# Patient Record
Sex: MALE | Race: WHITE | NOT HISPANIC OR LATINO | Employment: OTHER | URBAN - METROPOLITAN AREA
[De-identification: names, ages, dates, MRNs, and addresses within clinical notes are randomized per-mention and may not be internally consistent; named-entity substitution may affect disease eponyms.]

---

## 2020-01-06 ENCOUNTER — HOSPITAL ENCOUNTER (OUTPATIENT)
Dept: NUCLEAR MEDICINE | Facility: HOSPITAL | Age: 45
Discharge: HOME | End: 2020-01-06
Attending: INTERNAL MEDICINE

## 2020-02-06 ENCOUNTER — OFFICE VISIT (OUTPATIENT)
Dept: CARDIOLOGY | Facility: CLINIC | Age: 45
End: 2020-02-06

## 2020-02-06 VITALS
BODY MASS INDEX: 42.66 KG/M2 | WEIGHT: 315 LBS | SYSTOLIC BLOOD PRESSURE: 118 MMHG | DIASTOLIC BLOOD PRESSURE: 60 MMHG | OXYGEN SATURATION: 95 % | HEIGHT: 72 IN | HEART RATE: 67 BPM

## 2020-02-06 DIAGNOSIS — I48.0 PAF (PAROXYSMAL ATRIAL FIBRILLATION) (HCC): Primary | ICD-10-CM

## 2020-02-06 PROCEDURE — 93000 ELECTROCARDIOGRAM COMPLETE: CPT | Performed by: INTERNAL MEDICINE

## 2020-02-06 PROCEDURE — 99204 OFFICE O/P NEW MOD 45 MIN: CPT | Performed by: INTERNAL MEDICINE

## 2020-02-06 RX ORDER — AMIODARONE HYDROCHLORIDE 200 MG/1
200 TABLET ORAL DAILY
COMMUNITY
End: 2020-03-19

## 2020-02-06 RX ORDER — ALLOPURINOL 300 MG/1
300 TABLET ORAL DAILY
COMMUNITY

## 2020-02-06 RX ORDER — LISINOPRIL 2.5 MG/1
2.5 TABLET ORAL DAILY
COMMUNITY

## 2020-02-10 NOTE — H&P (VIEW-ONLY)
Subjective:     Encounter Date:02/06/2020      Patient ID: Benjie Coleman is a 44 y.o. male.    Chief Complaint:  Atrial Fibrillation   Presents for initial visit. Symptoms are negative for chest pain, palpitations, shortness of breath and tachycardia. Past medical history includes atrial fibrillation.       44-year-old gentleman who presents today for evaluation.  Patient got sick back in December 9, 2019.  At that time he had the flu and was very ill and was admitted to the hospital in [facility].  Patient's work-up included echocardiogram that showed his ejection fraction was 38%.  He underwent a perfusion study that showed no ischemia his ejection fraction by nuclear perfusion study was calculated at 34%.  Patient has a history of sleep apnea he uses his CPAP intermittently.  He says any falls asleep on the couch he does not utilize it but for the most part if he goes to bed he utilizes it.  He wanted another opinion and subsequent presents today for evaluation.      Review of Systems   Cardiovascular: Negative for chest pain and palpitations.   Respiratory: Negative for shortness of breath.    All other systems reviewed and are negative.        ECG 12 Lead  Date/Time: 2/6/2020 1:48 PM  Performed by: Naseem Martinez MD  Authorized by: Naseem Martinez MD   Comparison: compared with previous ECG from 12/23/2019  Similar to previous ECG  Rhythm: atrial fibrillation    Clinical impression: abnormal EKG               Objective:     Physical Exam   Constitutional: He is oriented to person, place, and time. He appears well-developed.   HENT:   Head: Normocephalic.   Eyes: Conjunctivae are normal.   Neck: Normal range of motion.   Cardiovascular: Normal rate and normal heart sounds. An irregularly irregular rhythm present.   Pulmonary/Chest: Breath sounds normal.   Abdominal: Soft. Bowel sounds are normal.   Musculoskeletal: Normal range of motion. He exhibits no edema.   Neurological: He is alert and oriented to  person, place, and time.   Skin: Skin is warm and dry.   Psychiatric: He has a normal mood and affect. His behavior is normal.   Vitals reviewed.      Lab Review:       Assessment:          Diagnosis Plan   1. PAF (paroxysmal atrial fibrillation) (CMS/[facility])  Cardioversion External in Cardiology Department          Plan:       1.  Atrial fibrillation.  Patient remains in atrial fibrillation with a rapid rate which is more likely causing his decreased LV function.  Since his rate has been so hard to control and the fact his decreased EF I do think he would benefit from a cardioversion.  He is started on Eliquis and has not missed a dose since he started it in mid December.  He is also been working on his weight he was 414 when he came to the hospital he is down to 394.  I also stressed the importance of utilizing his CPAP it can help change his atrial fibrillation.  He was placed on amiodarone by a cardiologist in [place].  In someone who so young I am really would like to get him off this.  For the time being we will leave him on it to help cardiovert him.  We will set him up for cardioversion risk and benefits were explained.  Also of note his QTc interval on his ECG today calculated at 517 however this is due to the atrial fibrillation with rapid rate and I continue the amiodarone at the current dose.

## 2020-02-10 NOTE — PROGRESS NOTES
Subjective:     Encounter Date:02/06/2020      Patient ID: Benjie Coleman is a 44 y.o. male.    Chief Complaint:  Atrial Fibrillation   Presents for initial visit. Symptoms are negative for chest pain, palpitations, shortness of breath and tachycardia. Past medical history includes atrial fibrillation.       44-year-old gentleman who presents today for evaluation.  Patient got sick back in December 9, 2019.  At that time he had the flu and was very ill and was admitted to the hospital in [facility].  Patient's work-up included echocardiogram that showed his ejection fraction was 38%.  He underwent a perfusion study that showed no ischemia his ejection fraction by nuclear perfusion study was calculated at 34%.  Patient has a history of sleep apnea he uses his CPAP intermittently.  He says any falls asleep on the couch he does not utilize it but for the most part if he goes to bed he utilizes it.  He wanted another opinion and subsequent presents today for evaluation.      Review of Systems   Cardiovascular: Negative for chest pain and palpitations.   Respiratory: Negative for shortness of breath.    All other systems reviewed and are negative.        ECG 12 Lead  Date/Time: 2/6/2020 1:48 PM  Performed by: Naseem Martinez MD  Authorized by: Naseem Martinez MD   Comparison: compared with previous ECG from 12/23/2019  Similar to previous ECG  Rhythm: atrial fibrillation    Clinical impression: abnormal EKG               Objective:     Physical Exam   Constitutional: He is oriented to person, place, and time. He appears well-developed.   HENT:   Head: Normocephalic.   Eyes: Conjunctivae are normal.   Neck: Normal range of motion.   Cardiovascular: Normal rate and normal heart sounds. An irregularly irregular rhythm present.   Pulmonary/Chest: Breath sounds normal.   Abdominal: Soft. Bowel sounds are normal.   Musculoskeletal: Normal range of motion. He exhibits no edema.   Neurological: He is alert and oriented to  person, place, and time.   Skin: Skin is warm and dry.   Psychiatric: He has a normal mood and affect. His behavior is normal.   Vitals reviewed.      Lab Review:       Assessment:          Diagnosis Plan   1. PAF (paroxysmal atrial fibrillation) (CMS/[facility])  Cardioversion External in Cardiology Department          Plan:       1.  Atrial fibrillation.  Patient remains in atrial fibrillation with a rapid rate which is more likely causing his decreased LV function.  Since his rate has been so hard to control and the fact his decreased EF I do think he would benefit from a cardioversion.  He is started on Eliquis and has not missed a dose since he started it in mid December.  He is also been working on his weight he was 414 when he came to the hospital he is down to 394.  I also stressed the importance of utilizing his CPAP it can help change his atrial fibrillation.  He was placed on amiodarone by a cardiologist in [place].  In someone who so young I am really would like to get him off this.  For the time being we will leave him on it to help cardiovert him.  We will set him up for cardioversion risk and benefits were explained.  Also of note his QTc interval on his ECG today calculated at 517 however this is due to the atrial fibrillation with rapid rate and I continue the amiodarone at the current dose.

## 2020-02-13 ENCOUNTER — TRANSCRIBE ORDERS (OUTPATIENT)
Dept: CARDIOLOGY | Facility: CLINIC | Age: 45
End: 2020-02-13

## 2020-02-13 DIAGNOSIS — Z13.6 SCREENING FOR ISCHEMIC HEART DISEASE: ICD-10-CM

## 2020-02-13 DIAGNOSIS — Z01.810 PRE-OPERATIVE CARDIOVASCULAR EXAMINATION: Primary | ICD-10-CM

## 2020-02-14 ENCOUNTER — TRANSCRIBE ORDERS (OUTPATIENT)
Dept: CARDIOLOGY | Facility: CLINIC | Age: 45
End: 2020-02-14

## 2020-02-17 ENCOUNTER — ANESTHESIA EVENT (OUTPATIENT)
Dept: POSTOP/PACU | Facility: HOSPITAL | Age: 45
End: 2020-02-17

## 2020-02-17 ENCOUNTER — ANESTHESIA (OUTPATIENT)
Dept: POSTOP/PACU | Facility: HOSPITAL | Age: 45
End: 2020-02-17

## 2020-02-17 ENCOUNTER — HOSPITAL ENCOUNTER (OUTPATIENT)
Dept: POSTOP/PACU | Facility: HOSPITAL | Age: 45
Discharge: HOME | End: 2020-02-17
Admitting: INTERNAL MEDICINE

## 2020-02-17 VITALS — DIASTOLIC BLOOD PRESSURE: 93 MMHG | OXYGEN SATURATION: 100 % | SYSTOLIC BLOOD PRESSURE: 116 MMHG

## 2020-02-17 VITALS
HEART RATE: 63 BPM | OXYGEN SATURATION: 96 % | SYSTOLIC BLOOD PRESSURE: 139 MMHG | DIASTOLIC BLOOD PRESSURE: 104 MMHG | TEMPERATURE: 97.8 F | RESPIRATION RATE: 18 BRPM

## 2020-02-17 DIAGNOSIS — I48.0 PAF (PAROXYSMAL ATRIAL FIBRILLATION) (HCC): ICD-10-CM

## 2020-02-17 LAB
ANION GAP SERPL CALCULATED.3IONS-SCNC: 9.3 MMOL/L (ref 5–15)
BUN BLD-MCNC: 18 MG/DL (ref 6–20)
BUN/CREAT SERPL: 17 (ref 7–25)
CALCIUM SPEC-SCNC: 8.6 MG/DL (ref 8.6–10.5)
CHLORIDE SERPL-SCNC: 104 MMOL/L (ref 98–107)
CO2 SERPL-SCNC: 28.7 MMOL/L (ref 22–29)
CREAT BLD-MCNC: 1.06 MG/DL (ref 0.76–1.27)
GFR SERPL CREATININE-BSD FRML MDRD: 76 ML/MIN/1.73
GLUCOSE BLD-MCNC: 100 MG/DL (ref 65–99)
POTASSIUM BLD-SCNC: 4.3 MMOL/L (ref 3.5–5.2)
SODIUM BLD-SCNC: 142 MMOL/L (ref 136–145)

## 2020-02-17 PROCEDURE — 25010000003 LIDOCAINE 1 % SOLUTION: Performed by: NURSE ANESTHETIST, CERTIFIED REGISTERED

## 2020-02-17 PROCEDURE — 25010000002 PROPOFOL 10 MG/ML EMULSION: Performed by: NURSE ANESTHETIST, CERTIFIED REGISTERED

## 2020-02-17 PROCEDURE — 93010 ELECTROCARDIOGRAM REPORT: CPT | Performed by: INTERNAL MEDICINE

## 2020-02-17 PROCEDURE — 80048 BASIC METABOLIC PNL TOTAL CA: CPT | Performed by: INTERNAL MEDICINE

## 2020-02-17 PROCEDURE — 92960 CARDIOVERSION ELECTRIC EXT: CPT

## 2020-02-17 PROCEDURE — 92960 CARDIOVERSION ELECTRIC EXT: CPT | Performed by: INTERNAL MEDICINE

## 2020-02-17 PROCEDURE — 93005 ELECTROCARDIOGRAM TRACING: CPT | Performed by: INTERNAL MEDICINE

## 2020-02-17 RX ORDER — SODIUM CHLORIDE 0.9 % (FLUSH) 0.9 %
3-10 SYRINGE (ML) INJECTION AS NEEDED
Status: CANCELLED | OUTPATIENT
Start: 2020-02-17

## 2020-02-17 RX ORDER — ONDANSETRON 2 MG/ML
4 INJECTION INTRAMUSCULAR; INTRAVENOUS ONCE AS NEEDED
Status: DISCONTINUED | OUTPATIENT
Start: 2020-02-17 | End: 2020-02-18 | Stop reason: HOSPADM

## 2020-02-17 RX ORDER — LIDOCAINE HYDROCHLORIDE 10 MG/ML
0.5 INJECTION, SOLUTION EPIDURAL; INFILTRATION; INTRACAUDAL; PERINEURAL ONCE AS NEEDED
Status: CANCELLED | OUTPATIENT
Start: 2020-02-17

## 2020-02-17 RX ORDER — PROPOFOL 10 MG/ML
VIAL (ML) INTRAVENOUS AS NEEDED
Status: DISCONTINUED | OUTPATIENT
Start: 2020-02-17 | End: 2020-02-17 | Stop reason: SURG

## 2020-02-17 RX ORDER — FAMOTIDINE 10 MG/ML
20 INJECTION, SOLUTION INTRAVENOUS ONCE
Status: CANCELLED | OUTPATIENT
Start: 2020-02-17 | End: 2020-02-17

## 2020-02-17 RX ORDER — SODIUM CHLORIDE 0.9 % (FLUSH) 0.9 %
3 SYRINGE (ML) INJECTION EVERY 12 HOURS SCHEDULED
Status: CANCELLED | OUTPATIENT
Start: 2020-02-17

## 2020-02-17 RX ORDER — SODIUM CHLORIDE, SODIUM LACTATE, POTASSIUM CHLORIDE, CALCIUM CHLORIDE 600; 310; 30; 20 MG/100ML; MG/100ML; MG/100ML; MG/100ML
INJECTION, SOLUTION INTRAVENOUS CONTINUOUS PRN
Status: DISCONTINUED | OUTPATIENT
Start: 2020-02-17 | End: 2020-02-17 | Stop reason: SURG

## 2020-02-17 RX ORDER — LIDOCAINE HYDROCHLORIDE 10 MG/ML
INJECTION, SOLUTION INFILTRATION; PERINEURAL AS NEEDED
Status: DISCONTINUED | OUTPATIENT
Start: 2020-02-17 | End: 2020-02-17 | Stop reason: SURG

## 2020-02-17 RX ORDER — SODIUM CHLORIDE, SODIUM LACTATE, POTASSIUM CHLORIDE, CALCIUM CHLORIDE 600; 310; 30; 20 MG/100ML; MG/100ML; MG/100ML; MG/100ML
9 INJECTION, SOLUTION INTRAVENOUS CONTINUOUS
Status: CANCELLED | OUTPATIENT
Start: 2020-02-17

## 2020-02-17 RX ADMIN — SODIUM CHLORIDE, POTASSIUM CHLORIDE, SODIUM LACTATE AND CALCIUM CHLORIDE: 600; 310; 30; 20 INJECTION, SOLUTION INTRAVENOUS at 07:50

## 2020-02-17 RX ADMIN — PROPOFOL 60 MG: 10 INJECTION, EMULSION INTRAVENOUS at 07:53

## 2020-02-17 RX ADMIN — PROPOFOL 60 MG: 10 INJECTION, EMULSION INTRAVENOUS at 07:55

## 2020-02-17 RX ADMIN — LIDOCAINE HYDROCHLORIDE 40 ML: 10 INJECTION, SOLUTION INFILTRATION; PERINEURAL at 07:52

## 2020-02-17 NOTE — ANESTHESIA PREPROCEDURE EVALUATION
Anesthesia Evaluation     Patient summary reviewed and Nursing notes reviewed   NPO Solid Status: > 8 hours  NPO Liquid Status: > 2 hours           Airway   Mallampati: II  TM distance: >3 FB  Neck ROM: full  Dental - normal exam     Pulmonary - normal exam    breath sounds clear to auscultation  (+) sleep apnea,   Cardiovascular - normal exam    Rhythm: regular  Rate: normal    (+) hypertension well controlled less than 2 medications, dysrhythmias Atrial Fib,   (-) angina, orthopnea, PND, JUAREZ      Neuro/Psych- negative ROS  GI/Hepatic/Renal/Endo    (+) morbid obesity,      Musculoskeletal (-) negative ROS    Abdominal    Substance History - negative use     OB/GYN negative ob/gyn ROS         Other - negative ROS                       Anesthesia Plan    ASA 3     MAC       Anesthetic plan, all risks, benefits, and alternatives have been provided, discussed and informed consent has been obtained with: patient.

## 2020-02-17 NOTE — ANESTHESIA POSTPROCEDURE EVALUATION
Patient: Benjie Coleman    Procedure Summary     Date:  02/17/20 Room / Location:  [facility] PACU    Anesthesia Start:  0750 Anesthesia Stop:  0805    Procedure:  CARDIOVERSION EXTERNAL IN CARDIOLOGY DEPARTMENT Diagnosis:       PAF (paroxysmal atrial fibrillation) (CMS/HCC)      (afib)    Scheduled Providers:  Naseem Martinez MD Provider:  Senthil Cochran MD    Anesthesia Type:  MAC ASA Status:  3          Anesthesia Type: MAC    Vitals  Vitals Value Taken Time   /70 2/17/2020  8:10 AM   Temp     Pulse 65 2/17/2020  8:21 AM   Resp 18 2/17/2020  8:21 AM   SpO2 99 % 2/17/2020  8:10 AM           Post Anesthesia Care and Evaluation    Patient location during evaluation: bedside  Patient participation: complete - patient participated  Level of consciousness: awake and alert  Pain management: adequate  Airway patency: patent  Anesthetic complications: No anesthetic complications    Cardiovascular status: acceptable  Respiratory status: acceptable  Hydration status: acceptable    Comments: BP (!) 140/106   Pulse 67   Temp 36.6 °C (97.8 °F) (Oral)   Resp 18   SpO2 95%

## 2020-03-18 ENCOUNTER — TELEPHONE (OUTPATIENT)
Dept: CARDIOLOGY | Facility: CLINIC | Age: 45
End: 2020-03-18

## 2020-03-18 NOTE — TELEPHONE ENCOUNTER
KUSUM for pt to call about appt tomorrow.  I told him I need to due a health screening before he comes and asked him to call back./prt

## 2020-03-19 ENCOUNTER — OFFICE VISIT (OUTPATIENT)
Dept: CARDIOLOGY | Facility: CLINIC | Age: 45
End: 2020-03-19

## 2020-03-19 VITALS
DIASTOLIC BLOOD PRESSURE: 84 MMHG | WEIGHT: 315 LBS | HEART RATE: 61 BPM | BODY MASS INDEX: 42.66 KG/M2 | SYSTOLIC BLOOD PRESSURE: 130 MMHG | HEIGHT: 72 IN

## 2020-03-19 DIAGNOSIS — I48.0 PAF (PAROXYSMAL ATRIAL FIBRILLATION) (HCC): Primary | ICD-10-CM

## 2020-03-19 DIAGNOSIS — I42.8 NICM (NONISCHEMIC CARDIOMYOPATHY) (HCC): ICD-10-CM

## 2020-03-19 PROCEDURE — 99214 OFFICE O/P EST MOD 30 MIN: CPT | Performed by: INTERNAL MEDICINE

## 2020-03-19 PROCEDURE — 93000 ELECTROCARDIOGRAM COMPLETE: CPT | Performed by: INTERNAL MEDICINE

## 2020-03-19 NOTE — PROGRESS NOTES
Subjective:     Encounter Date:03/19/2020      Patient ID: Benjie Coleman is a 44 y.o. male.    Chief Complaint:  Atrial Fibrillation   Presents for follow-up visit. Symptoms are negative for chest pain, hypertension, palpitations and shortness of breath. The symptoms have been stable. Past medical history includes atrial fibrillation.     44-year-old gentleman with a history of atrial fibrillation.  He presented to a hospital in [facility] for what he thought was the flu.44-year-old gentlemanUltimately he was diagnosed with atrial fibrillation and subsequently came to my office several months ago.  He remained in atrial fibrillation so ultimately I cardioverted him.  He presents today for reassessment doing well.  He said he never really felt his atrial fibrillation exactly sure he feels much different his ECG confirmed he still in sinus rhythm today.  He was placed on amiodarone by physician in [facility] and had spoke to him before about how I really want to stop this.  Again clinically he is doing well.      Review of Systems   Cardiovascular: Negative for chest pain and palpitations.   Respiratory: Negative for shortness of breath.          ECG 12 Lead  Date/Time: 3/19/2020 2:19 PM  Performed by: Naseem Martinez MD  Authorized by: Naseem Martinez MD   Comparison: compared with previous ECG from 2/17/2020  Similar to previous ECG  Rhythm: sinus rhythm    Clinical impression: normal ECG               Objective:     Physical Exam   Constitutional: He is oriented to person, place, and time. He appears well-developed.   HENT:   Head: Normocephalic.   Eyes: Conjunctivae are normal.   Neck: Normal range of motion.   Cardiovascular: Normal rate, regular rhythm and normal heart sounds.   Pulmonary/Chest: Breath sounds normal.   Abdominal: Soft. Bowel sounds are normal.   Musculoskeletal: Normal range of motion. He exhibits no edema.   Neurological: He is alert and oriented to person, place, and time.   Skin: Skin  is warm and dry.   Psychiatric: He has a normal mood and affect. His behavior is normal.   Vitals reviewed.      Lab Review:       Assessment:          Diagnosis Plan   1. PAF (paroxysmal atrial fibrillation) (CMS/HCC)  Adult Transthoracic Echo Complete W/ Cont if Necessary Per Protocol   2. NICM (nonischemic cardiomyopathy) (CMS/HCC)  Adult Transthoracic Echo Complete W/ Cont if Necessary Per Protocol          Plan:         1   Paroxysmal atrial fibrillation.  He remains in sinus rhythm on today's ECG.  I do want to stop his amiodarone so he is going to stop this.  I would leave him on his Eliquis for the time being.  I went to see him back in about 3 months I am also going to set him up for an echocardiogram at that time to see if his ejection fraction has recovered.  He clinically is doing well but says he never really felt bad even when he was in atrial fibrillation.  Will follow for now and see what evolves see back in 3 months.

## 2020-03-22 ENCOUNTER — TELEPHONE (OUTPATIENT)
Dept: CARDIOLOGY | Facility: CLINIC | Age: 45
End: 2020-03-22

## 2020-03-22 NOTE — TELEPHONE ENCOUNTER
ELIQUIS APPROVED    02/21/20 TO 03/22/21    CASE ID: [id number]    RX# [id number]    PER [facility]

## 2020-10-29 ENCOUNTER — OFFICE VISIT (OUTPATIENT)
Dept: SLEEP MEDICINE | Facility: HOSPITAL | Age: 45
End: 2020-10-29

## 2020-10-29 VITALS
OXYGEN SATURATION: 99 % | WEIGHT: 315 LBS | SYSTOLIC BLOOD PRESSURE: 151 MMHG | HEIGHT: 72 IN | HEART RATE: 84 BPM | BODY MASS INDEX: 42.66 KG/M2 | DIASTOLIC BLOOD PRESSURE: 90 MMHG

## 2020-10-29 DIAGNOSIS — E66.01 MORBID (SEVERE) OBESITY DUE TO EXCESS CALORIES (HCC): ICD-10-CM

## 2020-10-29 DIAGNOSIS — R06.83 SNORING: ICD-10-CM

## 2020-10-29 DIAGNOSIS — R06.81 WITNESSED EPISODE OF APNEA: Primary | ICD-10-CM

## 2020-10-29 PROCEDURE — G0463 HOSPITAL OUTPT CLINIC VISIT: HCPCS

## 2020-10-29 NOTE — PROGRESS NOTES
"[facility]  Telephone: [phone] / Fax: [phone] [place]  Telephone: [phone] / Fax: [phone] [facility] Abreu    Referring Physician: Bao Dobson MD  PCP: Bao Dobson MD    Reason for consult:  sleep apnea    Benjie Coleman is a 45 y.o.male  was seen in the Sleep Disorders Center today for evaluation of sleep apnea.     He was diagnosed with KESHA 10 years ago. He used the machine for 10 years-until 3 weeks ago-when it suddenly broke.  I do not have a copy of his old sleep study. He is here today to be re-evaluated for a new machine. He sleeps from 9:30pm-6:30am. He reports history of loud snoring, gasping for breath and choking-reported by girl friend and daughter. If he uses the CPAP machine he dreams. If he does not use the machine, he dreams rarely. He has history of a-fib requiring cardioversion in February 2020. His ESS is 11.      SH- works with [employer], drinks 4 alc drinks per week, 1/2 coffee per day.    ROS-negative.    Benjie Coleman  has a past medical history of Atrial fibrillation (CMS/HCC) (12/09/2019) and Sleep apnea.    Current Medications:    Current Outpatient Medications:   •  allopurinol (ZYLOPRIM) 300 MG tablet, Take 300 mg by mouth Daily., Disp: , Rfl:   •  apixaban (ELIQUIS) 5 MG tablet tablet, Take 1 tablet by mouth Every 12 (Twelve) Hours., Disp: 60 tablet, Rfl: 11  •  lisinopril (PRINIVIL,ZESTRIL) 2.5 MG tablet, Take 2.5 mg by mouth Daily., Disp: , Rfl:     I have reviewed Past Medical History, Past Surgical History, Medication List, Social History and Family History as entered in Sleep Questionnaire and EPIC.    ESS  11   Vital Signs /90   Pulse 84   Ht 182.9 cm (72\")   Wt (!) 178 kg (391 lb 8 oz)   SpO2 99%   BMI 53.10 kg/m²  Body mass index is 53.1 kg/m².    General Alert and oriented. No acute distress noted   Pharynx/Throat Class IV Mallampati airway, large tongue, no evidence of redundant lateral " pharyngeal tissue. No oral lesions. No thrush. Moist mucous membranes.   Head Normocephalic. Symmetrical. Atraumatic.    Nose No septal deviation. No drainage   Chest Wall Normal shape. Symmetric expansion with respiration. No tenderness.   Neck Trachea midline, no thyromegaly or adenopathy    Lungs Clear to auscultation bilaterally. No wheezes. No rhonchi. No rales. Respirations regular, even and unlabored.   Heart Regular rhythm and normal rate. Normal S1 and S2. No murmur   Abdomen Soft, non-tender and non-distended. Normal bowel sounds. No masses.   Extremities Moves all extremities well. No edema   Psychiatric Normal mood and affect.        Impression:  1. Witnessed episode of apnea    2. Snoring    3. Morbid (severe) obesity due to excess calories (CMS/HCC)          Plan:  I discussed the pathophysiology of obstructive sleep apnea with the patient.  We discussed the adverse outcomes associated with untreated sleep-disordered breathing.  We discussed treatment modalities of obstructive sleep apnea including CPAP device. Sleep study will be scheduled to establish a definitive diagnosis of sleep disorder breathing.  Weight loss will be strongly beneficial in order to reduce the severity of sleep-disordered breathing.  Patient has narrow oropharyngeal structure.  Caution during activities that require prolonged concentration is strongly advised.  After sleep study results are available, patient will be notified, and appointment will be scheduled to discuss sleep study results and treatment recommendations.    We will try to get the HST done ASAP as he needs a new machine ASAP. He likely has very severe KESHA with sleep related hypoxemia.      I appreciate the opportunity to participate in this patient's care.      MEGGAN Bernal  [facility]  Phone: [phone]      Part of this note may be an electronic transcription/translation of spoken language to printed text using the Dragon Dictation  System. Some errors may exist even though the document was edited.

## 2020-11-12 ENCOUNTER — HOSPITAL ENCOUNTER (OUTPATIENT)
Dept: SLEEP MEDICINE | Facility: HOSPITAL | Age: 45
End: 2020-11-12

## 2020-12-01 ENCOUNTER — HOSPITAL ENCOUNTER (OUTPATIENT)
Dept: SLEEP MEDICINE | Facility: HOSPITAL | Age: 45
Discharge: HOME | End: 2020-12-01

## 2020-12-01 DIAGNOSIS — R06.81 WITNESSED EPISODE OF APNEA: ICD-10-CM

## 2020-12-01 DIAGNOSIS — R06.83 SNORING: ICD-10-CM

## 2020-12-01 DIAGNOSIS — E66.01 MORBID (SEVERE) OBESITY DUE TO EXCESS CALORIES (HCC): ICD-10-CM

## 2020-12-28 ENCOUNTER — HOSPITAL ENCOUNTER (OUTPATIENT)
Dept: SLEEP MEDICINE | Facility: HOSPITAL | Age: 45
Discharge: HOME | End: 2020-12-28
Admitting: INTERNAL MEDICINE

## 2020-12-28 PROCEDURE — 95806 SLEEP STUDY UNATT&RESP EFFT: CPT

## 2021-01-05 ENCOUNTER — TELEPHONE (OUTPATIENT)
Dept: SLEEP MEDICINE | Facility: HOSPITAL | Age: 46
End: 2021-01-05

## 2021-01-05 NOTE — TELEPHONE ENCOUNTER
Spoke to patient about results, Faxerd orders to [facility] and asked them to [facility] if possible as patient is severe. F/u on 3/5/2021-AK

## 2021-03-05 ENCOUNTER — OFFICE VISIT (OUTPATIENT)
Dept: SLEEP MEDICINE | Facility: HOSPITAL | Age: 46
End: 2021-03-05

## 2021-03-05 VITALS
OXYGEN SATURATION: 97 % | WEIGHT: 315 LBS | HEIGHT: 72 IN | HEART RATE: 60 BPM | BODY MASS INDEX: 42.66 KG/M2 | SYSTOLIC BLOOD PRESSURE: 120 MMHG | DIASTOLIC BLOOD PRESSURE: 72 MMHG

## 2021-03-05 DIAGNOSIS — E66.01 OBESITY, MORBID, BMI 40.0-49.9 (HCC): ICD-10-CM

## 2021-03-05 DIAGNOSIS — G47.33 OBSTRUCTIVE SLEEP APNEA: Primary | ICD-10-CM

## 2021-03-05 PROCEDURE — G0463 HOSPITAL OUTPT CLINIC VISIT: HCPCS

## 2021-03-07 NOTE — PROGRESS NOTES
"[facility] SLEEP MEDICINE  [address] [facility]  3RD FLOOR  [place]iatric [address]  [phone]    PCP: Bao Dobson MD    Reason for visit:  Sleep disorders: KESHA    Benjie is a 45 y.o.male who was seen in the Sleep Disorders Center today.  Patient was last seen here 10/19/2020.  He had previously been using a CPAP machine which broke.  He came to us requesting a new study.  He was set up with the same after a home sleep test.  HST showed severe sleep apnea.  He is doing well with his new CPAP.  He sleeps from 9:30 PM to 6 AM.  He wakes up rested and refreshed.  Uses a full facemask.  Some leaks but no dry mouth.  No EDS.  His DME is [facility].  [place] Sleepiness Scale is 0. Caffeine 0 per day. Alcohol for per week.    Benjie  reports that he has never smoked. He has never used smokeless tobacco.    Pertinent Positive Review of Systems of denies  Rest of Review of Systems was negative as recorded in Sleep Questionnaire.    Patient  has a past medical history of Atrial fibrillation (CMS/[facility]) (12/09/2019) and Sleep apnea.     Current Medications:    Current Outpatient Medications:   •  allopurinol (ZYLOPRIM) 300 MG tablet, Take 300 mg by mouth Daily., Disp: , Rfl:   •  lisinopril (PRINIVIL,ZESTRIL) 2.5 MG tablet, Take 2.5 mg by mouth Daily., Disp: , Rfl:    also entered in Sleep Questionnaire         Vital Signs: /72   Pulse 60   Ht 182.9 cm (72\")   Wt (!) 181 kg (399 lb)   SpO2 97%   BMI 54.11 kg/m²     Body mass index is 54.11 kg/m².       Tongue: Normal     Dentition: good       Pharynx: Posterior pharyngeal pillars are wide   Mallampatti: IV (only hard palate visible)        General: Alert. Cooperative. Well developed. No acute distress.             Head:  Normocephalic. Symmetrical. Atraumatic.              Nose: No septal deviation. No drainage.          Throat: No oral lesions. No thrush. Moist mucous membranes.    Chest Wall:  Normal shape. Symmetric expansion with respiration. No " tenderness.             Neck:  Trachea midline.           Lungs:  Clear to auscultation bilaterally. No wheezes. No rhonchi. No rales. Respirations regular, even and unlabored.            Heart:  Regular rhythm and normal rate. Normal S1 and S2. No murmur.     Abdomen:  Soft, non-tender and non-distended. Normal bowel sounds. No masses.  Extremities:  Moves all extremities well. No edema.    Psychiatric: Normal mood and affect.    Diagnostic data available to date is as below and was reviewed on current visit:  · 12/29/2020  The patient tolerated the home sleep testing with monitoring time of 269 minutes. The data obtained make this a technically adequate study. The apnea hypopneas index(AHI) was 83.2 per sleep hour.  The AHI during supine position was 92.4 per sleep hour. Mean heart rate of 62.8 BPM.  Snoring was noted -% of sleep time. Lowest oxygen saturation during the study was 33%. Saturation below 89% was noted for 218.6 mins.     Most current available usage data reviewed on 03/05/2021:  · Good compliance and set up with average usage 7 hours 45 minutes.  Leak for 1 hour.  AHI 2.5.  Average pressure 13 to 16 cm.    [employer]: [employer]    Impression:  1. Obstructive sleep apnea    2. Obesity, morbid, BMI 40.0-49.9 (CMS/HCC)        Plan:  Benjie likes his current machine.  He likes to keep his mask slightly loose.  He therefore has some air leaks.  However it does not bother him.  He wakes up rested.  He was reminded to change his supplies regularly.  Otherwise compliant and doing well.    I reiterated the importance of effective treatment of obstructive sleep apnea with PAP machine.  Cardiovascular health risks of untreated sleep apnea were again reviewed.  Patient was asked to remain cautious if there is persistent hypersomnolence. The benefit of weight loss in reducing severity of obstructive sleep apnea was discussed.  Patient would benefit from adhering to a strict diet to achieve ideal BMI.     Change of  PAP supplies regularly is important for effective use.  Change of cushion on the mask or plugs on nasal pillows along with disposable filters once every month and change of mask frame, tubing, headgear and Velcro straps every 6 months at the minimum was reiterated.    This patient is compliant with PAP machine and benefits from its use.  Apnea hypopneas index is corrected/improved.  Daytime hypersomnolence has resolved.     Patient will follow up in this clinic in 1 year APRN    Thank you for allowing me to participate in your patient's care.    Electronically signed by Ez Simms MD, 03/07/21, 6:25 PM EST.    Part of this note may be an electronic transcription/translation of spoken language to printed text using the Dragon Dictation System.

## 2021-07-19 ENCOUNTER — TELEPHONE (OUTPATIENT)
Dept: SLEEP MEDICINE | Facility: HOSPITAL | Age: 46
End: 2021-07-19

## 2021-07-19 ENCOUNTER — OFFICE VISIT (OUTPATIENT)
Dept: SLEEP MEDICINE | Facility: HOSPITAL | Age: 46
End: 2021-07-19

## 2021-07-19 VITALS
HEART RATE: 62 BPM | BODY MASS INDEX: 42.66 KG/M2 | WEIGHT: 315 LBS | OXYGEN SATURATION: 95 % | DIASTOLIC BLOOD PRESSURE: 78 MMHG | SYSTOLIC BLOOD PRESSURE: 134 MMHG | HEIGHT: 72 IN

## 2021-07-19 DIAGNOSIS — E66.01 OBESITY, MORBID, BMI 40.0-49.9 (HCC): ICD-10-CM

## 2021-07-19 DIAGNOSIS — G47.33 OBSTRUCTIVE SLEEP APNEA: Primary | ICD-10-CM

## 2021-07-19 PROCEDURE — G0463 HOSPITAL OUTPT CLINIC VISIT: HCPCS

## 2021-07-19 NOTE — PROGRESS NOTES
"[facility]  Telephone: [phone] / Fax: [phone] [place]  Telephone: [phone] / Fax: [phone] Cesia Abreu    PCP: Bao Dobson MD    Reason for visit: KESHA f/u    Benjie Coleman is a 46 y.o.male  was last seen at [facility] sleep lab in March 2021. He returns today earlier than scheduled due to concern for aerophagia. His machine is set at 12-15cm. Pressures ramp and he wakes up on some morning around 3-4am. 5/7 days in week he reports pressure in the back/chest Pressure in the back wakes him up. It lasts 30 minutes and then goes away by itself but he can't go back to sleep after that. Sleep quality has improved since he started CPAP. He no longer takes naps during the day!  He can tell a difference when he uses the machine. He reduced the pressures from 12-20 to 12-15cm himself, but aerophagia did not improve.  His machine is currently set at 12-15cm pressures  He has DreamStation Auto CPAP.    SH- +no tobacco, drinks 4 alc per week 8 caffeine per day, no energy drinks.    ROS- +chest pain/pressure, rest is negative.    RUTH Valenzuela's    Current Medications:    Current Outpatient Medications:   •  allopurinol (ZYLOPRIM) 300 MG tablet, Take 300 mg by mouth Daily., Disp: , Rfl:   •  lisinopril (PRINIVIL,ZESTRIL) 2.5 MG tablet, Take 2.5 mg by mouth Daily., Disp: , Rfl:    also entered in Sleep Questionnaire    Patient  has a past medical history of Atrial fibrillation (CMS/HCC) (12/09/2019) and Sleep apnea.    I have reviewed the Past Medical History, Past Surgical History, Social History and Family History.    Vital Signs /78   Pulse 62   Ht 182.9 cm (72\")   Wt (!) 179 kg (394 lb)   SpO2 95%   BMI 53.44 kg/m²  Body mass index is 53.44 kg/m².    General Alert and oriented. No acute distress noted   Pharynx/Throat Class IV  Mallampati airway, large tongue, no evidence of redundant lateral pharyngeal tissue. No oral lesions. No thrush. Moist mucous membranes.   Head " Normocephalic. Symmetrical. Atraumatic.    Nose No septal deviation. No drainage   Chest Wall Normal shape. Symmetric expansion with respiration. No tenderness.   Neck Trachea midline, no thyromegaly or adenopathy    Lungs Clear to auscultation bilaterally. No wheezes. No rhonchi. No rales. Respirations regular, even and unlabored.   Heart Regular rhythm and normal rate. Normal S1 and S2. No murmur   Abdomen Soft, non-tender and non-distended. Normal bowel sounds. No masses.   Extremities Moves all extremities well. No edema   Psychiatric Normal mood and affect.       Testing:  · Download  6/19/21-7/18/21 96% use with average nightly use of 8 hours and 17 minutes on auto CPAP 12-15cm with average pressure of 17.9cm.    Study- Diagnostic findings: The patient tolerated the home sleep testing with monitoring time of 269 minutes. The data obtained make this a technically adequate study. The apnea hypopneas index(AHI) was 83.2 per sleep hour.  The AHI during supine position was 92.4 per sleep hour. Mean heart rate of 62.8 BPM.  Snoring was noted -% of sleep time. Lowest oxygen saturation during the study was 33%. Saturation below 89% was noted for 218.6 mins.        Impression:  1. Obstructive sleep apnea    2. Obesity, morbid, BMI 40.0-49.9 (CMS/HCC)          Plan:  Reduce CPAP pressures to 10-14cm to help decrease aerophagia/back/chest pressure. If symptoms do not get better, I asked him to f/u with PCP or cardiology to do full cardiac work up. He denies tightness in the chest or radiation of the pain to neck /jaw or arm. I asked pt to call us and if he wakes up gasping for breath or choking during sleep on lower CPAP pressures.    I discussed recent [employer] J recall with patient. I explained the short and long term effects of polyester-based polyurethane(PE-PUR) foam degradation. I have also discussed with patient the consequences of untreated moderate-to severe KESHA .Patient was asked to register her device with  [employer] to see if it is affected by recent recall. If it is, the device needs to be replaced. I    Patient uses the CPAP device and benefits from its use in terms of reduction of hypersomnia and snoring.  AHI appears to be within adequate range. I reviewed download report and original sleep study report with the patient.     Weight loss will be strongly beneficial to reduce the severity of sleep-disordered breathing.  Caution during activities that require prolonged concentration is strongly advised if sleepiness returns. Changing of PAP supplies regularly is important for effective use.        Follow up with Dr. Simms in 3 months    Thank you for allowing me to participate in your patient's care.      MEGGAN Bernal  [facility]  Phone: [phone]      Part of this note may be an electronic transcription/translation of spoken language to printed text using the Dragon Dictation System.

## 2021-07-19 NOTE — TELEPHONE ENCOUNTER
Pressure changed to 10-14cm per Natacha.  [place] [facility] [place]

## 2021-10-01 ENCOUNTER — APPOINTMENT (OUTPATIENT)
Dept: SLEEP MEDICINE | Facility: HOSPITAL | Age: 46
End: 2021-10-01